# Patient Record
Sex: MALE | Race: WHITE | URBAN - METROPOLITAN AREA
[De-identification: names, ages, dates, MRNs, and addresses within clinical notes are randomized per-mention and may not be internally consistent; named-entity substitution may affect disease eponyms.]

---
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
## 2020-03-10 ENCOUNTER — HEALTH MAINTENANCE LETTER (OUTPATIENT)
Age: 20
End: 2020-03-10

## 2020-12-27 ENCOUNTER — HEALTH MAINTENANCE LETTER (OUTPATIENT)
Age: 20
End: 2020-12-27

## 2021-04-24 ENCOUNTER — HEALTH MAINTENANCE LETTER (OUTPATIENT)
Age: 21
End: 2021-04-24

## 2021-10-09 ENCOUNTER — HEALTH MAINTENANCE LETTER (OUTPATIENT)
Age: 21
End: 2021-10-09

## 2022-05-16 ENCOUNTER — HEALTH MAINTENANCE LETTER (OUTPATIENT)
Age: 22
End: 2022-05-16

## 2022-09-11 ENCOUNTER — HEALTH MAINTENANCE LETTER (OUTPATIENT)
Age: 22
End: 2022-09-11

## 2023-06-03 ENCOUNTER — HEALTH MAINTENANCE LETTER (OUTPATIENT)
Age: 23
End: 2023-06-03